# Patient Record
Sex: FEMALE | Race: BLACK OR AFRICAN AMERICAN | NOT HISPANIC OR LATINO | Employment: OTHER | URBAN - METROPOLITAN AREA
[De-identification: names, ages, dates, MRNs, and addresses within clinical notes are randomized per-mention and may not be internally consistent; named-entity substitution may affect disease eponyms.]

---

## 2018-02-07 ENCOUNTER — TELEPHONE (OUTPATIENT)
Dept: SMOKING CESSATION | Facility: CLINIC | Age: 58
End: 2018-02-07

## 2018-02-07 NOTE — TELEPHONE ENCOUNTER
Attempt to contact patient to introduce the Smoking Cessation Program. She states that she is interested in joining but will call back to get signed up for the benefits. Her car is in the shop and she is waiting in the waiting room to get it fixed and is unable to give out personal information.

## 2018-08-10 PROBLEM — R63.4 WEIGHT LOSS: Status: ACTIVE | Noted: 2018-08-10

## 2018-08-10 PROBLEM — I10 ESSENTIAL HYPERTENSION, BENIGN: Status: ACTIVE | Noted: 2018-08-10

## 2018-08-10 PROBLEM — L98.9 SKIN LESION: Status: ACTIVE | Noted: 2018-08-10

## 2018-08-22 PROBLEM — E55.9 VITAMIN D DEFICIENCY: Status: ACTIVE | Noted: 2018-08-22

## 2018-08-22 PROBLEM — Z11.3 ROUTINE SCREENING FOR STI (SEXUALLY TRANSMITTED INFECTION): Status: ACTIVE | Noted: 2018-08-22

## 2018-08-22 PROBLEM — Z00.00 ROUTINE GENERAL MEDICAL EXAMINATION AT A HEALTH CARE FACILITY: Status: ACTIVE | Noted: 2018-08-22

## 2018-08-22 PROBLEM — R94.31 ABNORMAL EKG: Status: ACTIVE | Noted: 2018-08-22

## 2018-08-22 PROBLEM — E04.1 THYROID NODULE: Status: ACTIVE | Noted: 2018-08-22

## 2018-08-29 ENCOUNTER — INITIAL CONSULT (OUTPATIENT)
Dept: DERMATOLOGY | Facility: CLINIC | Age: 58
End: 2018-08-29
Payer: COMMERCIAL

## 2018-08-29 DIAGNOSIS — L20.9 ATOPIC DERMATITIS, UNSPECIFIED TYPE: ICD-10-CM

## 2018-08-29 DIAGNOSIS — D18.00 ANGIOMA: ICD-10-CM

## 2018-08-29 DIAGNOSIS — D22.9 BENIGN NEVUS OF SKIN: Primary | ICD-10-CM

## 2018-08-29 PROBLEM — D72.820 LYMPHOCYTOSIS: Status: ACTIVE | Noted: 2018-08-29

## 2018-08-29 PROBLEM — E03.9 HYPOTHYROIDISM: Status: RESOLVED | Noted: 2018-08-29 | Resolved: 2018-08-29

## 2018-08-29 PROBLEM — E78.2 MIXED HYPERLIPIDEMIA: Status: ACTIVE | Noted: 2018-08-29

## 2018-08-29 PROCEDURE — 99203 OFFICE O/P NEW LOW 30 MIN: CPT | Mod: S$GLB,,, | Performed by: DERMATOLOGY

## 2018-08-29 PROCEDURE — 99999 PR PBB SHADOW E&M-EST. PATIENT-LVL II: CPT | Mod: PBBFAC,,, | Performed by: DERMATOLOGY

## 2018-08-29 NOTE — LETTER
August 29, 2018      Miriam Carranza MD  [address] [place] [address]           [facility]  [address]  [place] [address]  Phone: [phone]  Fax: [phone]          Patient: Bree Dangelo   MR Number: [id number]   YOB: 1960   Date of Visit: 8/29/2018       Dear Dr. Miriam Carranza:    Thank you for referring Bree Dangelo to me for evaluation. Attached you will find relevant portions of my assessment and plan of care.    If you have questions, please do not hesitate to call me. I look forward to following Bree Dangelo along with you.    Sincerely,    Poppy Diaz MD    Enclosure  CC:  No Recipients    If you would like to receive this communication electronically, please contact [email or web address] or [phone] to request more information on [employer] Link access.    For providers and/or their staff who would like to refer a patient to [facility], please contact us through our one-stop-shop provider referral line, [facility], at [phone].    If you feel you have received this communication in error or would no longer like to receive these types of communications, please e-mail [email or web address]

## 2018-08-29 NOTE — PROGRESS NOTES
Subjective:       Patient ID:  Bree Dangelo is a 57 y.o. female who presents for   Chief Complaint   Patient presents with    Mole     rash on both legs and moles on back and face     History of Present Illness: The patient presents with chief complaint of rash.  Location: bilateral legs  Duration: several months  Signs/Symptoms: pruritus, scaly    Prior treatments: TAC cream          Review of Systems   Constitutional: Negative for fever and chills.   Gastrointestinal: Negative for nausea and vomiting.   Skin: Positive for itching and rash. Negative for daily sunscreen use, activity-related sunscreen use and recent sunburn.   Hematologic/Lymphatic: Does not bruise/bleed easily.        Objective:    Physical Exam   Constitutional: She appears well-developed and well-nourished. No distress.   Neurological: She is alert and oriented to person, place, and time. She is not disoriented.   Psychiatric: She has a normal mood and affect.   Skin:   Areas Examined (abnormalities noted in diagram):   Head / Face Inspection Performed  Neck Inspection Performed  Chest / Axilla Inspection Performed  Abdomen Inspection Performed  Back Inspection Performed  RUE Inspected  LUE Inspection Performed  RLE Inspected  LLE Inspection Performed  Nails and Digits Inspection Performed              Diagram Legend      Vascular papule c/w angioma     Flesh colored to evenly pigmented papule c/w intradermal nevus       Assessment / Plan:        Benign nevus of skin  Reassurance given.  Lesions are benign.    Angioma  Reassurance given.  Lesions are benign.    Atopic dermatitis, unspecified type  -     crisaborole (EUCRISA) 2 % Oint; AAA bid  Dispense: 60 g; Refill: 1  -      Reviewed sensitive skin care, predominant pruritus. Will start above med.              Follow-up if symptoms worsen or fail to improve.

## 2018-08-29 NOTE — PATIENT INSTRUCTIONS
New Skin Care Regimen  Soap: Dove sensitive skin bar or liquid  Detergent: Tide Free, All Free, Purex Free or Cheer Free   Fabric softener: do not use  Bathing: shower or bathe with lukewarm water < 10 minutes

## 2018-09-21 PROBLEM — L25.9 CONTACT DERMATITIS: Status: ACTIVE | Noted: 2018-09-21

## 2018-11-26 PROBLEM — Z00.00 ROUTINE GENERAL MEDICAL EXAMINATION AT A HEALTH CARE FACILITY: Status: RESOLVED | Noted: 2018-08-22 | Resolved: 2018-11-26

## 2019-03-04 PROBLEM — D64.9 ANEMIA: Status: ACTIVE | Noted: 2019-03-04

## 2019-07-07 PROBLEM — R73.02 IGT (IMPAIRED GLUCOSE TOLERANCE): Status: ACTIVE | Noted: 2019-07-07

## 2019-08-27 PROBLEM — F10.10 ALCOHOL ABUSE: Status: ACTIVE | Noted: 2018-12-14

## 2019-08-27 PROBLEM — H81.22 VESTIBULAR NEURONITIS OF LEFT EAR: Status: ACTIVE | Noted: 2018-12-14

## 2019-08-27 PROBLEM — D70.8 OTHER NEUTROPENIA: Status: ACTIVE | Noted: 2019-08-27

## 2019-08-27 PROBLEM — Z00.00 ANNUAL PHYSICAL EXAM: Status: ACTIVE | Noted: 2018-08-22

## 2019-08-27 PROBLEM — D50.8 IRON DEFICIENCY ANEMIA SECONDARY TO INADEQUATE DIETARY IRON INTAKE: Status: ACTIVE | Noted: 2019-08-27

## 2019-08-27 PROBLEM — F41.1 GAD (GENERALIZED ANXIETY DISORDER): Status: ACTIVE | Noted: 2019-08-27

## 2019-08-27 PROBLEM — M85.80 OSTEOPENIA: Status: ACTIVE | Noted: 2019-08-27

## 2019-09-16 PROBLEM — R79.89 ELEVATED PARATHYROID HORMONE: Status: ACTIVE | Noted: 2019-09-16

## 2019-12-02 PROBLEM — Z00.00 ANNUAL PHYSICAL EXAM: Status: RESOLVED | Noted: 2018-08-22 | Resolved: 2019-12-02

## 2020-02-25 ENCOUNTER — HOSPITAL ENCOUNTER (OUTPATIENT)
Dept: RADIOLOGY | Facility: HOSPITAL | Age: 60
Discharge: HOME | End: 2020-02-25
Attending: INTERNAL MEDICINE
Payer: COMMERCIAL

## 2020-02-25 DIAGNOSIS — M25.561 ACUTE PAIN OF RIGHT KNEE: ICD-10-CM

## 2020-02-25 PROBLEM — M17.12 PRIMARY OSTEOARTHRITIS OF LEFT KNEE: Status: ACTIVE | Noted: 2020-02-25

## 2020-02-25 PROCEDURE — 73562 X-RAY EXAM OF KNEE 3: CPT | Mod: 26,RT,, | Performed by: RADIOLOGY

## 2020-02-25 PROCEDURE — 73562 XR KNEE ORTHO RIGHT: ICD-10-PCS | Mod: 26,RT,, | Performed by: RADIOLOGY

## 2020-02-25 PROCEDURE — 73560 X-RAY EXAM OF KNEE 1 OR 2: CPT | Mod: 26,LT,, | Performed by: RADIOLOGY

## 2020-02-25 PROCEDURE — 73560 X-RAY EXAM OF KNEE 1 OR 2: CPT | Mod: 59,TC,LT

## 2020-02-25 PROCEDURE — 73560 XR KNEE ORTHO RIGHT: ICD-10-PCS | Mod: 26,LT,, | Performed by: RADIOLOGY

## 2020-03-10 PROBLEM — B35.4 TINEA CORPORIS: Status: ACTIVE | Noted: 2020-03-10
